# Patient Record
Sex: MALE | Race: WHITE | URBAN - METROPOLITAN AREA
[De-identification: names, ages, dates, MRNs, and addresses within clinical notes are randomized per-mention and may not be internally consistent; named-entity substitution may affect disease eponyms.]

---

## 2019-03-18 ENCOUNTER — OFFICE VISIT (OUTPATIENT)
Dept: FAMILY MEDICINE CLINIC | Facility: CLINIC | Age: 59
End: 2019-03-18
Payer: COMMERCIAL

## 2019-03-18 VITALS
DIASTOLIC BLOOD PRESSURE: 70 MMHG | TEMPERATURE: 98 F | RESPIRATION RATE: 16 BRPM | SYSTOLIC BLOOD PRESSURE: 112 MMHG | HEART RATE: 80 BPM | OXYGEN SATURATION: 98 %

## 2019-03-18 DIAGNOSIS — H69.82 DYSFUNCTION OF LEFT EUSTACHIAN TUBE: Primary | ICD-10-CM

## 2019-03-18 PROBLEM — I10 HYPERTENSION: Status: ACTIVE | Noted: 2019-03-18

## 2019-03-18 PROCEDURE — 99202 OFFICE O/P NEW SF 15 MIN: CPT | Performed by: NURSE PRACTITIONER

## 2019-03-18 RX ORDER — AMLODIPINE BESYLATE 5 MG/1
TABLET ORAL
Refills: 1 | COMMUNITY
Start: 2019-02-09

## 2019-03-18 RX ORDER — ROSUVASTATIN CALCIUM 10 MG/1
TABLET, COATED ORAL
Refills: 4 | COMMUNITY
Start: 2019-03-12

## 2019-03-18 RX ORDER — IRBESARTAN AND HYDROCHLOROTHIAZIDE 300; 12.5 MG/1; MG/1
TABLET, FILM COATED ORAL
Refills: 1 | COMMUNITY
Start: 2019-02-09

## 2019-03-18 NOTE — PROGRESS NOTES
Bay Mcdonnell is a 66year old male who presents for  left ear fullness sensation. Problem has been occurring for  3  days. Symptoms have the same since onset. No sick contact. Denies ear discharge, swelling, or pain of the ear.   Denies fever, sinus ASSESSMENT:  Dysfunction of left eustachian tube  (primary encounter diagnosis)    PLAN:   Pt declined use of steroids and Sudafed as treatment. Will use Flonase and restart allergy medication.    Educated on increasing oral fluid intake, gum chewing and sw If your OME doesn't improve after 3 months, surgery may be used to drain the fluid and insert a small tube in the eardrum to allow continued drainage.   Because the middle ear fluid can become infected, it is important to watch for signs of an ear infection © 7753-9316 [employer] [address]. [address] [facility] [place] [facility] [place] [address] [place]. All rights reserved. This information is not intended as a substitute for professional medical care. Always follow your healthcare professional's instructions.             The

## 2019-11-16 ENCOUNTER — OFFICE VISIT (OUTPATIENT)
Dept: FAMILY MEDICINE CLINIC | Facility: CLINIC | Age: 59
End: 2019-11-16
Payer: COMMERCIAL

## 2019-11-16 VITALS
HEART RATE: 73 BPM | DIASTOLIC BLOOD PRESSURE: 81 MMHG | HEIGHT: 68 IN | TEMPERATURE: 99 F | BODY MASS INDEX: 32.13 KG/M2 | OXYGEN SATURATION: 95 % | RESPIRATION RATE: 16 BRPM | SYSTOLIC BLOOD PRESSURE: 131 MMHG | WEIGHT: 212 LBS

## 2019-11-16 DIAGNOSIS — B86 SCABIES: Primary | ICD-10-CM

## 2019-11-16 DIAGNOSIS — L28.2 PRURITIC RASH: ICD-10-CM

## 2019-11-16 PROCEDURE — 99213 OFFICE O/P EST LOW 20 MIN: CPT | Performed by: NURSE PRACTITIONER

## 2019-11-16 RX ORDER — PERMETHRIN 50 MG/G
1 CREAM TOPICAL ONCE
Qty: 1 TUBE | Refills: 1 | Status: SHIPPED | OUTPATIENT
Start: 2019-11-16 | End: 2019-11-16

## 2019-11-16 RX ORDER — PREDNISONE 20 MG/1
40 TABLET ORAL DAILY
Qty: 8 TABLET | Refills: 0 | Status: SHIPPED | OUTPATIENT
Start: 2019-11-16 | End: 2019-11-20

## 2019-11-16 NOTE — PROGRESS NOTES
CHIEF COMPLAINT:   Patient presents with:  Rash: X 6 days, in [place] had bites on legs now red dots on arms. No fevers. No one else in room has them or family. HPI:    Rogers Sahu is a 64year old male who presents for evaluation of a rash. GENERAL: feels well otherwise, no fever, no chills. SKIN: Per HPI. No edema. No ulcerations. HEENT: Denies rhinorrhea, edema of the lips or swelling of throat. CARDIOVASCULAR: Denies chest pains or palpitations.   LUNGS: Denies shortness of breath with e Sig: Apply 1 Application topically once for 1 dose. Apply from neck down sparing head and genital region, Keep on for 8-10 hours then wash off. • predniSONE 20 MG Oral Tab 8 tablet 0     Sig: Take 2 tablets (40 mg total) by mouth daily for 4 days. It may take 4 to 6 weeks for symptoms to appear after being exposed. Everyone living in the house with you, as well as your sexual partners, should be treated at the same time. After the first treatment, you will no longer be contagious.  You may return to · For babies or infants, put mittens on their hands. This will stop them from licking the cream or lotion. It will also stop them from scratching themselves because of the itching.   Other medicines  · An oral medicine called ivermectin may be prescribed fo Scabies is an infection caused by very tiny mites that burrow into the skin. The mites are called Sarcoptes scabiei. They cause severe itching. Though children are most commonly infected, anyone can get scabies.  Scabies mites can pass from person to person Scabies infections are usually treated with a prescription lotion that kills the mites. The lotion must be applied to the entire body from the neck down. This includes the palms of the hands, soles of the feet, groin, and under the fingernails.  The lotion · The infected person has a fever (00.4 degrees F (38degrees C) or higher, or as directed by your provider), red streaks, pain, or swelling of the skin.   · Yellow brown crusts or drainage from the sores  · Sores get worse or do not heal.  · New rashes appe Talk to your pediatrician regarding the use of this medicine in children. While this drug may be prescribed for children as young as 3months of age for selected conditions, precautions do apply.   What side effects may I notice from receiving this medicine Scabies is spread by direct skin contact with an infected person. Family members and sexual partners may require treatment with this medicine.  You should discuss this with your doctor or health care professional.  Using a normal washing cycle, you should w · allergic reactions like skin rash, itching or hives, swelling of the face, lips, or tongue  · changes in emotions or moods  · changes in vision  · depressed mood  · eye pain  · fever or chills, cough, sore throat, pain or difficulty passing urine  · incr · diabetes  · glaucoma  · heart disease  · high blood pressure  · infection (especially a virus infection such as chickenpox, cold sores, or herpes)  · kidney disease  · liver disease  · mental illness  · myasthenia gravis  · osteoporosis  · seizures  · st

## 2019-11-16 NOTE — PATIENT INSTRUCTIONS
Called pt at several different numbers. LM and other no answer. No vm/TGD   Scabies  Scabies is a skin infection. It is caused by a tiny parasitic insect, or mite, that is too small to see directly. It can be seen under a microscope, but it is usually recognized only by the rash and symptoms it causes.  This can make it hard to d · Use the cream on your body when your skin is cool and dry. Don’t use it after a hot shower or bath. · Usually the cream is put on your whole body. This means from your chin all the way down to your toes. Scabies does not usually affect an adult’s head. Follow up with your healthcare provider, or as advised. Call your provider if your symptoms don’t improve after 1 week, or if new burrows or rashes appear.   When to seek medical advice  Call your healthcare provider right away if any of these occur:  · Yel · Isaiah Forde created by mites traveling under the skin, which look like lines on the skin’s surface  Treating scabies infection  Scabies infections are usually treated with a prescription lotion that kills the mites.  The lotion must be applied to the entire b · Do not spray your house with chemicals or pesticides.  These can be dangerous to your family’s health.   When to call your healthcare provider  · The infected person has a fever (00.4 degrees F (38degrees C) or higher, or as directed by your provider), re This medicine is for external use only. Do not take by mouth. Follow the directions on the prescription label. A bath or shower is NOT recommended before applying this medicine.  Thoroughly rub the cream into all skin surfaces, from your head to the soles o Store at room temperature away from heat and direct light. Do not refrigerate or freeze. Throw away any unused medicine after the expiration date. What should I tell my health care provider before I take this medicine?   They need to know if you have any o PREDNISONE (PRED ni sone) is a corticosteroid. It is commonly used to treat inflammation of the skin, joints, lungs, and other organs. Common conditions treated include asthma, allergies, and arthritis.  It is also used for other conditions, such as blood d · aspirin and aspirin-like medicines  · barbiturates  · certain medicines for diabetes, like glipizide or glyburide  · cholestyramine  · cholinesterase inhibitors  · cyclosporine  · digoxin  · diuretics  · ephedrine  · female hormones, like estrogens and b This medicine may increase your risk of getting an infection. Tell your doctor or health care professional if you are around anyone with measles or chickenpox, or if you develop sores or blisters that do not heal properly.   If you are going to have surgery

## 2020-06-16 ENCOUNTER — HOSPITAL ENCOUNTER (OUTPATIENT)
Dept: CT IMAGING | Facility: HOSPITAL | Age: 60
Discharge: HOME | End: 2020-06-16
Attending: FAMILY MEDICINE

## 2020-06-16 DIAGNOSIS — Z13.6 SCREENING FOR CARDIOVASCULAR CONDITION: ICD-10-CM
